# Patient Record
Sex: FEMALE | Race: BLACK OR AFRICAN AMERICAN
[De-identification: names, ages, dates, MRNs, and addresses within clinical notes are randomized per-mention and may not be internally consistent; named-entity substitution may affect disease eponyms.]

---

## 2017-06-27 ENCOUNTER — HOSPITAL ENCOUNTER (EMERGENCY)
Dept: HOSPITAL 19 - COL.ER | Age: 37
Discharge: HOME | End: 2017-06-27
Payer: OTHER GOVERNMENT

## 2017-06-27 VITALS — TEMPERATURE: 99.1 F

## 2017-06-27 VITALS — WEIGHT: 169.32 LBS | BODY MASS INDEX: 30.76 KG/M2 | HEIGHT: 62.01 IN

## 2017-06-27 VITALS — SYSTOLIC BLOOD PRESSURE: 118 MMHG | HEART RATE: 80 BPM | DIASTOLIC BLOOD PRESSURE: 75 MMHG

## 2017-06-27 DIAGNOSIS — N83.201: Primary | ICD-10-CM

## 2017-06-27 DIAGNOSIS — R11.0: ICD-10-CM

## 2017-06-27 DIAGNOSIS — J45.909: ICD-10-CM

## 2017-06-27 DIAGNOSIS — M34.9: ICD-10-CM

## 2017-06-27 DIAGNOSIS — I10: ICD-10-CM

## 2017-06-27 LAB
ADJUSTED CALCIUM: 8.8 MG/DL (ref 8.4–10.2)
ALBUMIN SERPL-MCNC: 4.4 GM/DL (ref 3.5–5)
ALP SERPL-CCNC: 77 U/L (ref 50–136)
ALT SERPL-CCNC: 16 U/L (ref 9–52)
ANION GAP SERPL CALC-SCNC: 12 MMOL/L (ref 7–16)
BASOPHILS # BLD: 0 10*3/UL (ref 0–0.2)
BASOPHILS NFR BLD AUTO: 0.8 % (ref 0–2)
BILIRUB SERPL-MCNC: 0.5 MG/DL (ref 0–1)
BUN SERPL-MCNC: 10 MG/DL (ref 7–17)
CALCIUM SERPL-MCNC: 9.1 MG/DL (ref 8.4–10.2)
CHLORIDE SERPL-SCNC: 103 MMOL/L (ref 98–107)
CO2 SERPL-SCNC: 23 MMOL/L (ref 22–30)
CREAT SERPL-SCNC: 0.61 MG/DL (ref 0.52–1.25)
CRP SERPL-MCNC: < 0.5 MG/DL (ref 0–0.9)
EOSINOPHIL # BLD: 0.3 10*3/UL (ref 0–0.7)
EOSINOPHIL NFR BLD: 5.4 % (ref 0–4)
ERYTHROCYTE [DISTWIDTH] IN BLOOD BY AUTOMATED COUNT: 13.3 % (ref 11.5–14.5)
GLUCOSE SERPL-MCNC: 71 MG/DL (ref 74–106)
GRANULOCYTES # BLD AUTO: 34.9 % (ref 42.2–75.2)
HCT VFR BLD AUTO: 37.4 % (ref 37–47)
HGB BLD-MCNC: 11.8 G/DL (ref 12.5–16)
LIPASE SERPL-CCNC: 86 U/L (ref 23–300)
LYMPHOCYTES # BLD: 2.3 10*3/UL (ref 1.2–3.4)
LYMPHOCYTES NFR BLD: 45.6 % (ref 20–51)
MCH RBC QN AUTO: 27 PG (ref 27–31)
MCHC RBC AUTO-ENTMCNC: 32 G/DL (ref 33–37)
MCV RBC AUTO: 86 FL (ref 80–100)
MONOCYTES # BLD: 0.7 10*3/UL (ref 0.1–0.6)
MONOCYTES NFR BLD AUTO: 13.1 % (ref 1.7–9.3)
NEUTROPHILS # BLD: 1.8 10*3/UL (ref 1.4–6.5)
PH UR STRIP.AUTO: 6 [PH] (ref 5–8)
PLATELET # BLD AUTO: 295 K/MM3 (ref 130–400)
PMV BLD AUTO: 11 FL (ref 7.4–10.4)
POTASSIUM SERPL-SCNC: 4.1 MMOL/L (ref 3.4–5)
PROT SERPL-MCNC: 8.2 GM/DL (ref 6.4–8.2)
RBC # BLD AUTO: 4.36 M/MM3 (ref 4.1–5.3)
SODIUM SERPL-SCNC: 138 MMOL/L (ref 137–145)
SP GR UR STRIP.AUTO: 1.01 (ref 1–1.03)
SQUAMOUS # URNS: (no result) /HPF
WBC # BLD AUTO: 5 K/MM3 (ref 4.8–10.8)
WBC #/AREA URNS HPF: (no result) /HPF

## 2017-08-11 ENCOUNTER — HOSPITAL ENCOUNTER (EMERGENCY)
Dept: HOSPITAL 19 - COL.ER | Age: 37
Discharge: HOME | End: 2017-08-11
Payer: OTHER GOVERNMENT

## 2017-08-11 VITALS — SYSTOLIC BLOOD PRESSURE: 113 MMHG | DIASTOLIC BLOOD PRESSURE: 73 MMHG | HEART RATE: 85 BPM

## 2017-08-11 VITALS — TEMPERATURE: 99.1 F

## 2017-08-11 VITALS — HEIGHT: 62.01 IN | BODY MASS INDEX: 30.96 KG/M2 | WEIGHT: 170.42 LBS

## 2017-08-11 DIAGNOSIS — I73.00: ICD-10-CM

## 2017-08-11 DIAGNOSIS — R10.31: ICD-10-CM

## 2017-08-11 DIAGNOSIS — I10: ICD-10-CM

## 2017-08-11 DIAGNOSIS — G89.29: Primary | ICD-10-CM

## 2017-08-11 DIAGNOSIS — K21.9: ICD-10-CM

## 2017-08-11 DIAGNOSIS — M34.9: ICD-10-CM

## 2017-08-11 LAB
ADJUSTED CALCIUM: 8.8 MG/DL (ref 8.4–10.2)
ALBUMIN SERPL-MCNC: 4.3 GM/DL (ref 3.5–5)
ALP SERPL-CCNC: 97 U/L (ref 50–136)
ALT SERPL-CCNC: 21 U/L (ref 9–52)
ANION GAP SERPL CALC-SCNC: 10 MMOL/L (ref 7–16)
BASOPHILS # BLD: 0 10*3/UL (ref 0–0.2)
BASOPHILS NFR BLD AUTO: 0.6 % (ref 0–2)
BILIRUB SERPL-MCNC: 0.4 MG/DL (ref 0–1)
BUN SERPL-MCNC: 12 MG/DL (ref 7–17)
CALCIUM SERPL-MCNC: 9 MG/DL (ref 8.4–10.2)
CHLORIDE SERPL-SCNC: 104 MMOL/L (ref 98–107)
CO2 SERPL-SCNC: 23 MMOL/L (ref 22–30)
CREAT SERPL-SCNC: 0.56 MG/DL (ref 0.52–1.25)
CRP SERPL-MCNC: < 0.5 MG/DL (ref 0–0.9)
EOSINOPHIL # BLD: 0.1 10*3/UL (ref 0–0.7)
EOSINOPHIL NFR BLD: 2 % (ref 0–4)
ERYTHROCYTE [DISTWIDTH] IN BLOOD BY AUTOMATED COUNT: 13.4 % (ref 11.5–14.5)
GLUCOSE SERPL-MCNC: 85 MG/DL (ref 74–106)
GRANULOCYTES # BLD AUTO: 68.7 % (ref 42.2–75.2)
HCT VFR BLD AUTO: 36 % (ref 37–47)
HGB BLD-MCNC: 11.2 G/DL (ref 12.5–16)
LYMPHOCYTES # BLD: 1.4 10*3/UL (ref 1.2–3.4)
LYMPHOCYTES NFR BLD: 20.7 % (ref 20–51)
MCH RBC QN AUTO: 26 PG (ref 27–31)
MCHC RBC AUTO-ENTMCNC: 31 G/DL (ref 33–37)
MCV RBC AUTO: 85 FL (ref 80–100)
MONOCYTES # BLD: 0.5 10*3/UL (ref 0.1–0.6)
MONOCYTES NFR BLD AUTO: 7.5 % (ref 1.7–9.3)
NEUTROPHILS # BLD: 4.5 10*3/UL (ref 1.4–6.5)
PH UR STRIP.AUTO: 7 [PH] (ref 5–8)
PLATELET # BLD AUTO: 291 K/MM3 (ref 130–400)
PMV BLD AUTO: 10.4 FL (ref 7.4–10.4)
POTASSIUM SERPL-SCNC: 3.8 MMOL/L (ref 3.4–5)
PROT SERPL-MCNC: 7.9 GM/DL (ref 6.4–8.2)
RBC # BLD AUTO: 4.24 M/MM3 (ref 4.1–5.3)
SODIUM SERPL-SCNC: 138 MMOL/L (ref 137–145)
SP GR UR STRIP.AUTO: 1.01 (ref 1–1.03)
SQUAMOUS # URNS: (no result) /HPF
WBC # BLD AUTO: 6.6 K/MM3 (ref 4.8–10.8)
WBC #/AREA URNS HPF: (no result) /HPF

## 2017-10-06 ENCOUNTER — HOSPITAL ENCOUNTER (OUTPATIENT)
Dept: HOSPITAL 19 - SDCO | Age: 37
Discharge: HOME | End: 2017-10-06
Attending: SPECIALIST
Payer: OTHER GOVERNMENT

## 2017-10-06 VITALS — DIASTOLIC BLOOD PRESSURE: 53 MMHG | HEART RATE: 76 BPM | SYSTOLIC BLOOD PRESSURE: 93 MMHG

## 2017-10-06 VITALS — TEMPERATURE: 98.1 F | DIASTOLIC BLOOD PRESSURE: 57 MMHG | SYSTOLIC BLOOD PRESSURE: 96 MMHG | HEART RATE: 73 BPM

## 2017-10-06 VITALS — DIASTOLIC BLOOD PRESSURE: 63 MMHG | HEART RATE: 73 BPM | SYSTOLIC BLOOD PRESSURE: 78 MMHG

## 2017-10-06 VITALS — HEART RATE: 74 BPM | DIASTOLIC BLOOD PRESSURE: 66 MMHG | SYSTOLIC BLOOD PRESSURE: 100 MMHG

## 2017-10-06 VITALS — HEART RATE: 88 BPM | DIASTOLIC BLOOD PRESSURE: 72 MMHG | TEMPERATURE: 98.5 F | SYSTOLIC BLOOD PRESSURE: 126 MMHG

## 2017-10-06 VITALS — HEIGHT: 62.99 IN | WEIGHT: 167.33 LBS | BODY MASS INDEX: 29.65 KG/M2

## 2017-10-06 DIAGNOSIS — K25.9: ICD-10-CM

## 2017-10-06 DIAGNOSIS — K59.00: ICD-10-CM

## 2017-10-06 DIAGNOSIS — K58.9: ICD-10-CM

## 2017-10-06 DIAGNOSIS — K22.8: Primary | ICD-10-CM

## 2017-10-06 DIAGNOSIS — D50.9: ICD-10-CM

## 2017-10-06 DIAGNOSIS — K29.30: ICD-10-CM

## 2017-10-06 DIAGNOSIS — K21.9: ICD-10-CM

## 2017-10-06 DIAGNOSIS — K30: ICD-10-CM

## 2017-11-13 ENCOUNTER — HOSPITAL ENCOUNTER (OUTPATIENT)
Dept: HOSPITAL 19 - COL.RAD | Age: 37
End: 2017-11-13
Attending: SPECIALIST
Payer: OTHER GOVERNMENT

## 2017-11-13 DIAGNOSIS — R11.0: ICD-10-CM

## 2017-11-13 DIAGNOSIS — R10.13: Primary | ICD-10-CM

## 2017-11-13 DIAGNOSIS — K59.00: ICD-10-CM

## 2017-11-13 PROCEDURE — A9537 TC99M MEBROFENIN: HCPCS

## 2017-12-23 ENCOUNTER — HOSPITAL ENCOUNTER (EMERGENCY)
Dept: HOSPITAL 19 - COL.ER | Age: 37
Discharge: HOME | End: 2017-12-23
Payer: OTHER GOVERNMENT

## 2017-12-23 VITALS — WEIGHT: 167.33 LBS | BODY MASS INDEX: 29.65 KG/M2 | HEIGHT: 62.99 IN

## 2017-12-23 VITALS — SYSTOLIC BLOOD PRESSURE: 118 MMHG | HEART RATE: 72 BPM | DIASTOLIC BLOOD PRESSURE: 71 MMHG

## 2017-12-23 VITALS — TEMPERATURE: 98.4 F

## 2017-12-23 DIAGNOSIS — R10.11: Primary | ICD-10-CM

## 2017-12-23 LAB
ADJUSTED CALCIUM: 8.7 MG/DL (ref 8.4–10.2)
ALBUMIN SERPL-MCNC: 4.6 GM/DL (ref 3.5–5)
ALP SERPL-CCNC: 70 U/L (ref 50–136)
ALT SERPL-CCNC: 21 U/L (ref 9–52)
ANION GAP SERPL CALC-SCNC: 9 MMOL/L (ref 7–16)
BASOPHILS # BLD: 0 10*3/UL (ref 0–0.2)
BASOPHILS NFR BLD AUTO: 0.7 % (ref 0–2)
BILIRUB SERPL-MCNC: 0.7 MG/DL (ref 0–1)
BUN SERPL-MCNC: 15 MG/DL (ref 7–17)
CALCIUM SERPL-MCNC: 9.2 MG/DL (ref 8.4–10.2)
CHLORIDE SERPL-SCNC: 104 MMOL/L (ref 98–107)
CO2 SERPL-SCNC: 25 MMOL/L (ref 22–30)
CREAT SERPL-SCNC: 0.58 MG/DL (ref 0.52–1.25)
CRP SERPL-MCNC: < 0.5 MG/DL (ref 0–0.9)
EOSINOPHIL # BLD: 0.2 10*3/UL (ref 0–0.7)
EOSINOPHIL NFR BLD: 3.9 % (ref 0–4)
GLUCOSE SERPL-MCNC: 87 MG/DL (ref 74–106)
GRANULOCYTES # BLD AUTO: 44.1 % (ref 42.2–75.2)
HCT VFR BLD AUTO: 34.6 % (ref 37–47)
HGB BLD-MCNC: 10.6 G/DL (ref 12.5–16)
LIPASE SERPL-CCNC: 92 U/L (ref 23–300)
LYMPHOCYTES # BLD: 1.7 10*3/UL (ref 1.2–3.4)
LYMPHOCYTES NFR BLD: 37.6 % (ref 20–51)
MCH RBC QN AUTO: 25 PG (ref 27–31)
MCHC RBC AUTO-ENTMCNC: 31 G/DL (ref 33–37)
MCV RBC AUTO: 83 FL (ref 80–100)
MONOCYTES # BLD: 0.6 10*3/UL (ref 0.1–0.6)
MONOCYTES NFR BLD AUTO: 13.5 % (ref 1.7–9.3)
NEUTROPHILS # BLD: 2 10*3/UL (ref 1.4–6.5)
PH UR STRIP.AUTO: 6 [PH] (ref 5–8)
PLATELET # BLD AUTO: 272 K/MM3 (ref 130–400)
PMV BLD AUTO: 11.2 FL (ref 7.4–10.4)
POTASSIUM SERPL-SCNC: 3.9 MMOL/L (ref 3.4–5)
PROT SERPL-MCNC: 8.1 GM/DL (ref 6.4–8.2)
RBC # BLD AUTO: 4.18 M/MM3 (ref 4.1–5.3)
RBC # UR: (no result) /HPF
SODIUM SERPL-SCNC: 138 MMOL/L (ref 137–145)
SP GR UR STRIP.AUTO: 1.02 (ref 1–1.03)
SQUAMOUS # URNS: (no result) /HPF
URN COLLECT METHOD CLASS: (no result)
WBC # BLD AUTO: 4.6 K/MM3 (ref 4.8–10.8)
WBC #/AREA URNS HPF: (no result) /HPF

## 2018-01-15 ENCOUNTER — HOSPITAL ENCOUNTER (EMERGENCY)
Dept: HOSPITAL 19 - COL.ER | Age: 38
LOS: 1 days | Discharge: HOME | End: 2018-01-16
Payer: OTHER GOVERNMENT

## 2018-01-15 VITALS — BODY MASS INDEX: 32.05 KG/M2 | WEIGHT: 176.37 LBS | HEIGHT: 62.01 IN

## 2018-01-15 VITALS — TEMPERATURE: 99.3 F

## 2018-01-15 DIAGNOSIS — Z87.19: ICD-10-CM

## 2018-01-15 DIAGNOSIS — N89.8: Primary | ICD-10-CM

## 2018-01-15 DIAGNOSIS — Z98.51: ICD-10-CM

## 2018-01-15 DIAGNOSIS — J45.909: ICD-10-CM

## 2018-01-15 DIAGNOSIS — E78.5: ICD-10-CM

## 2018-01-15 LAB
ALBUMIN SERPL-MCNC: 4.4 GM/DL (ref 3.5–5)
ALP SERPL-CCNC: 78 U/L (ref 50–136)
ALT SERPL-CCNC: 25 U/L (ref 9–52)
ANION GAP SERPL CALC-SCNC: 9 MMOL/L (ref 7–16)
AST SERPL-CCNC: 29 U/L (ref 15–37)
BASOPHILS # BLD: 0 10*3/UL (ref 0–0.2)
BASOPHILS NFR BLD AUTO: 0.7 % (ref 0–2)
BILIRUB SERPL-MCNC: 0.3 MG/DL (ref 0–1)
BUN SERPL-MCNC: 13 MG/DL (ref 7–17)
CALCIUM SERPL-MCNC: 9.2 MG/DL (ref 8.4–10.2)
CHLORIDE SERPL-SCNC: 105 MMOL/L (ref 98–107)
CO2 SERPL-SCNC: 27 MMOL/L (ref 22–30)
CREAT SERPL-SCNC: 0.69 MG/DL (ref 0.52–1.25)
EOSINOPHIL # BLD: 0.2 10*3/UL (ref 0–0.7)
EOSINOPHIL NFR BLD: 4.4 % (ref 0–4)
ERYTHROCYTE [DISTWIDTH] IN BLOOD BY AUTOMATED COUNT: 14.3 % (ref 11.5–14.5)
GLUCOSE SERPL-MCNC: 87 MG/DL (ref 74–106)
GRANULOCYTES # BLD AUTO: 33 % (ref 42.2–75.2)
HCT VFR BLD AUTO: 35.9 % (ref 37–47)
HGB BLD-MCNC: 10.8 G/DL (ref 12.5–16)
LYMPHOCYTES # BLD: 2.4 10*3/UL (ref 1.2–3.4)
LYMPHOCYTES NFR BLD: 52.9 % (ref 20–51)
MCH RBC QN AUTO: 25 PG (ref 27–31)
MCHC RBC AUTO-ENTMCNC: 30 G/DL (ref 33–37)
MCV RBC AUTO: 82 FL (ref 80–100)
MONOCYTES # BLD: 0.4 10*3/UL (ref 0.1–0.6)
MONOCYTES NFR BLD AUTO: 8.8 % (ref 1.7–9.3)
NEUTROPHILS # BLD: 1.5 10*3/UL (ref 1.4–6.5)
PH UR STRIP.AUTO: 6 [PH] (ref 5–8)
PLATELET # BLD AUTO: 313 K/MM3 (ref 130–400)
PMV BLD AUTO: 10.4 FL (ref 7.4–10.4)
POTASSIUM SERPL-SCNC: 3.7 MMOL/L (ref 3.4–5)
PROT SERPL-MCNC: 8 GM/DL (ref 6.4–8.2)
RBC # BLD AUTO: 4.38 M/MM3 (ref 4.1–5.3)
RBC # UR STRIP.AUTO: (no result) /UL
RBC # UR: (no result) /HPF
SODIUM SERPL-SCNC: 141 MMOL/L (ref 137–145)
SP GR UR STRIP.AUTO: 1 (ref 1–1.03)
SQUAMOUS # URNS: (no result) /HPF
URN COLLECT METHOD CLASS: (no result)

## 2018-01-16 VITALS — HEART RATE: 80 BPM | SYSTOLIC BLOOD PRESSURE: 115 MMHG | DIASTOLIC BLOOD PRESSURE: 81 MMHG

## 2018-04-07 ENCOUNTER — HOSPITAL ENCOUNTER (EMERGENCY)
Dept: HOSPITAL 19 - COL.ER | Age: 38
Discharge: HOME | End: 2018-04-07
Payer: OTHER GOVERNMENT

## 2018-04-07 VITALS — BODY MASS INDEX: 29.84 KG/M2 | HEIGHT: 62.01 IN | WEIGHT: 164.24 LBS

## 2018-04-07 VITALS — TEMPERATURE: 98.5 F

## 2018-04-07 VITALS — SYSTOLIC BLOOD PRESSURE: 109 MMHG | HEART RATE: 84 BPM | DIASTOLIC BLOOD PRESSURE: 83 MMHG

## 2018-04-07 DIAGNOSIS — E78.5: ICD-10-CM

## 2018-04-07 DIAGNOSIS — Z90.49: ICD-10-CM

## 2018-04-07 DIAGNOSIS — Z98.51: ICD-10-CM

## 2018-04-07 DIAGNOSIS — R10.31: ICD-10-CM

## 2018-04-07 DIAGNOSIS — Z87.2: ICD-10-CM

## 2018-04-07 DIAGNOSIS — K21.9: ICD-10-CM

## 2018-04-07 DIAGNOSIS — G89.29: Primary | ICD-10-CM

## 2018-04-07 DIAGNOSIS — I10: ICD-10-CM

## 2018-04-07 LAB
ALBUMIN SERPL-MCNC: 4 GM/DL (ref 3.5–5)
ALP SERPL-CCNC: 102 U/L (ref 50–136)
ALT SERPL-CCNC: 31 U/L (ref 9–52)
ANION GAP SERPL CALC-SCNC: 14 MMOL/L (ref 7–16)
APAP SERPL-MCNC: < 10 UG/ML (ref 10–30)
AST SERPL-CCNC: 28 U/L (ref 15–37)
BASOPHILS # BLD: 0 10*3/UL (ref 0–0.2)
BASOPHILS NFR BLD AUTO: 0.5 % (ref 0–2)
BILIRUB SERPL-MCNC: 0.2 MG/DL (ref 0–1)
BUN SERPL-MCNC: 13 MG/DL (ref 7–17)
CALCIUM SERPL-MCNC: 8.7 MG/DL (ref 8.4–10.2)
CHLORIDE SERPL-SCNC: 105 MMOL/L (ref 98–107)
CO2 SERPL-SCNC: 22 MMOL/L (ref 22–30)
CREAT SERPL-SCNC: 0.61 MG/DL (ref 0.52–1.25)
CRP SERPL-MCNC: < 0.5 MG/DL (ref 0–0.9)
EOSINOPHIL # BLD: 0.2 10*3/UL (ref 0–0.7)
EOSINOPHIL NFR BLD: 2.7 % (ref 0–4)
ERYTHROCYTE [DISTWIDTH] IN BLOOD BY AUTOMATED COUNT: 14.4 % (ref 11.5–14.5)
GLUCOSE SERPL-MCNC: 85 MG/DL (ref 74–106)
GRANULOCYTES # BLD AUTO: 43.9 % (ref 42.2–75.2)
HCT VFR BLD AUTO: 32.2 % (ref 37–47)
HGB BLD-MCNC: 9.8 G/DL (ref 12.5–16)
INR BLD: 1.1 (ref 0.8–3)
LIPASE SERPL-CCNC: 142 U/L (ref 23–300)
LYMPHOCYTES # BLD: 2.3 10*3/UL (ref 1.2–3.4)
LYMPHOCYTES NFR BLD: 40.9 % (ref 20–51)
MCH RBC QN AUTO: 25 PG (ref 27–31)
MCHC RBC AUTO-ENTMCNC: 30 G/DL (ref 33–37)
MCV RBC AUTO: 81 FL (ref 80–100)
MONOCYTES # BLD: 0.7 10*3/UL (ref 0.1–0.6)
MONOCYTES NFR BLD AUTO: 11.8 % (ref 1.7–9.3)
NEUTROPHILS # BLD: 2.4 10*3/UL (ref 1.4–6.5)
PH UR STRIP.AUTO: 6 [PH] (ref 5–8)
PLATELET # BLD AUTO: 286 K/MM3 (ref 130–400)
PMV BLD AUTO: 11 FL (ref 7.4–10.4)
POTASSIUM SERPL-SCNC: 3.8 MMOL/L (ref 3.4–5)
PROT SERPL-MCNC: 8.3 GM/DL (ref 6.4–8.2)
PROTHROMBIN TIME: 12.3 SECONDS (ref 9.7–12.8)
RBC # BLD AUTO: 3.99 M/MM3 (ref 4.1–5.3)
SODIUM SERPL-SCNC: 141 MMOL/L (ref 137–145)
SP GR UR STRIP.AUTO: 1 (ref 1–1.03)
SQUAMOUS # URNS: (no result) /HPF
URN COLLECT METHOD CLASS: (no result)

## 2018-04-10 ENCOUNTER — HOSPITAL ENCOUNTER (OUTPATIENT)
Dept: HOSPITAL 19 - MHCPAIN | Age: 38
End: 2018-04-10
Attending: ANESTHESIOLOGY
Payer: OTHER GOVERNMENT

## 2018-04-10 DIAGNOSIS — G58.8: Primary | ICD-10-CM

## 2018-05-04 ENCOUNTER — HOSPITAL ENCOUNTER (OUTPATIENT)
Dept: HOSPITAL 19 - MHCPAIN | Age: 38
End: 2018-05-04
Attending: ANESTHESIOLOGY
Payer: OTHER GOVERNMENT

## 2018-05-04 DIAGNOSIS — M79.1: ICD-10-CM

## 2018-05-04 DIAGNOSIS — G89.29: Primary | ICD-10-CM

## 2018-05-04 DIAGNOSIS — M79.2: ICD-10-CM

## 2018-05-04 PROCEDURE — G0463 HOSPITAL OUTPT CLINIC VISIT: HCPCS

## 2018-05-11 ENCOUNTER — HOSPITAL ENCOUNTER (OUTPATIENT)
Dept: HOSPITAL 19 - MHCPAIN | Age: 38
End: 2018-05-11
Attending: ANESTHESIOLOGY
Payer: OTHER GOVERNMENT

## 2018-05-11 DIAGNOSIS — G57.81: Primary | ICD-10-CM

## 2018-07-02 ENCOUNTER — HOSPITAL ENCOUNTER (EMERGENCY)
Dept: HOSPITAL 19 - COL.ER | Age: 38
Discharge: HOME | End: 2018-07-02
Payer: OTHER GOVERNMENT

## 2018-07-02 VITALS — DIASTOLIC BLOOD PRESSURE: 61 MMHG | HEART RATE: 67 BPM | SYSTOLIC BLOOD PRESSURE: 107 MMHG

## 2018-07-02 VITALS — BODY MASS INDEX: 28.04 KG/M2 | WEIGHT: 154.32 LBS | HEIGHT: 62.01 IN

## 2018-07-02 VITALS — TEMPERATURE: 98.2 F

## 2018-07-02 DIAGNOSIS — R10.11: ICD-10-CM

## 2018-07-02 DIAGNOSIS — G89.29: Primary | ICD-10-CM

## 2018-07-02 DIAGNOSIS — I10: ICD-10-CM

## 2018-07-02 DIAGNOSIS — R10.31: ICD-10-CM

## 2018-07-02 DIAGNOSIS — K21.9: ICD-10-CM

## 2018-07-02 LAB
ALBUMIN SERPL-MCNC: 4.2 GM/DL (ref 3.5–5)
ALP SERPL-CCNC: 93 U/L (ref 50–136)
ALT SERPL-CCNC: 19 U/L (ref 9–52)
ANION GAP SERPL CALC-SCNC: 11 MMOL/L (ref 7–16)
AST SERPL-CCNC: 30 U/L (ref 15–37)
BASOPHILS # BLD: 0 10*3/UL (ref 0–0.2)
BASOPHILS NFR BLD AUTO: 0.5 % (ref 0–2)
BILIRUB SERPL-MCNC: 0.2 MG/DL (ref 0–1)
BUN SERPL-MCNC: 15 MG/DL (ref 7–17)
CALCIUM SERPL-MCNC: 9.2 MG/DL (ref 8.4–10.2)
CHLORIDE SERPL-SCNC: 103 MMOL/L (ref 98–107)
CO2 SERPL-SCNC: 26 MMOL/L (ref 22–30)
CREAT SERPL-SCNC: 0.62 MG/DL (ref 0.52–1.25)
CRP SERPL-MCNC: < 0.5 MG/DL (ref 0–0.9)
EOSINOPHIL # BLD: 0.1 10*3/UL (ref 0–0.7)
EOSINOPHIL NFR BLD: 2.4 % (ref 0–4)
ERYTHROCYTE [DISTWIDTH] IN BLOOD BY AUTOMATED COUNT: 16.2 % (ref 11.5–14.5)
GLUCOSE SERPL-MCNC: 82 MG/DL (ref 74–106)
GRANULOCYTES # BLD AUTO: 31.7 % (ref 42.2–75.2)
HCT VFR BLD AUTO: 34.6 % (ref 37–47)
HGB BLD-MCNC: 10.2 G/DL (ref 12.5–16)
LYMPHOCYTES # BLD: 2.2 10*3/UL (ref 1.2–3.4)
LYMPHOCYTES NFR BLD: 53.3 % (ref 20–51)
MCH RBC QN AUTO: 24 PG (ref 27–31)
MCHC RBC AUTO-ENTMCNC: 30 G/DL (ref 33–37)
MCV RBC AUTO: 81 FL (ref 80–100)
MONOCYTES # BLD: 0.5 10*3/UL (ref 0.1–0.6)
MONOCYTES NFR BLD AUTO: 11.9 % (ref 1.7–9.3)
NEUTROPHILS # BLD: 1.3 10*3/UL (ref 1.4–6.5)
PH UR STRIP.AUTO: 6 [PH] (ref 5–8)
PLATELET # BLD AUTO: 298 K/MM3 (ref 130–400)
PMV BLD AUTO: 10.6 FL (ref 7.4–10.4)
POTASSIUM SERPL-SCNC: 3.5 MMOL/L (ref 3.4–5)
PROT SERPL-MCNC: 7.9 GM/DL (ref 6.4–8.2)
RBC # BLD AUTO: 4.27 M/MM3 (ref 4.1–5.3)
RBC # UR: (no result) /HPF
SODIUM SERPL-SCNC: 140 MMOL/L (ref 137–145)
SP GR UR STRIP.AUTO: 1.01 (ref 1–1.03)
SQUAMOUS # URNS: (no result) /HPF
URN COLLECT METHOD CLASS: (no result)

## 2019-01-12 ENCOUNTER — HOSPITAL ENCOUNTER (EMERGENCY)
Dept: HOSPITAL 19 - COL.ER | Age: 39
LOS: 1 days | Discharge: HOME | End: 2019-01-13
Payer: OTHER GOVERNMENT

## 2019-01-12 VITALS — WEIGHT: 170.42 LBS | HEIGHT: 62.01 IN | BODY MASS INDEX: 30.96 KG/M2

## 2019-01-12 VITALS — TEMPERATURE: 98.8 F

## 2019-01-12 DIAGNOSIS — E78.5: ICD-10-CM

## 2019-01-12 DIAGNOSIS — Z87.39: ICD-10-CM

## 2019-01-12 DIAGNOSIS — I10: ICD-10-CM

## 2019-01-12 DIAGNOSIS — R07.89: Primary | ICD-10-CM

## 2019-01-12 DIAGNOSIS — K21.9: ICD-10-CM

## 2019-01-12 LAB
ALBUMIN SERPL-MCNC: 4.3 GM/DL (ref 3.5–5)
ALP SERPL-CCNC: 80 U/L (ref 50–136)
ALT SERPL-CCNC: 13 U/L (ref 9–52)
ANION GAP SERPL CALC-SCNC: 7 MMOL/L (ref 7–16)
AST SERPL-CCNC: 32 U/L (ref 15–37)
BASOPHILS # BLD: 0 10*3/UL (ref 0–0.2)
BASOPHILS NFR BLD AUTO: 0.6 % (ref 0–2)
BILIRUB SERPL-MCNC: 0.3 MG/DL (ref 0–1)
BUN SERPL-MCNC: 13 MG/DL (ref 7–17)
CALCIUM SERPL-MCNC: 9.3 MG/DL (ref 8.4–10.2)
CHLORIDE SERPL-SCNC: 104 MMOL/L (ref 98–107)
CO2 SERPL-SCNC: 28 MMOL/L (ref 22–30)
CREAT SERPL-SCNC: 0.79 MG/DL (ref 0.52–1.25)
CRP SERPL-MCNC: < 0.5 MG/DL (ref 0–0.9)
EOSINOPHIL # BLD: 0.2 10*3/UL (ref 0–0.7)
EOSINOPHIL NFR BLD: 3.4 % (ref 0–4)
ERYTHROCYTE [DISTWIDTH] IN BLOOD BY AUTOMATED COUNT: 14.7 % (ref 11.5–14.5)
GLUCOSE SERPL-MCNC: 82 MG/DL (ref 74–106)
GRANULOCYTES # BLD AUTO: 37.5 % (ref 42.2–75.2)
HCT VFR BLD AUTO: 36.5 % (ref 37–47)
HGB BLD-MCNC: 11.1 G/DL (ref 12.5–16)
LIPASE SERPL-CCNC: 119 U/L (ref 23–300)
LYMPHOCYTES # BLD: 2.2 10*3/UL (ref 1.2–3.4)
LYMPHOCYTES NFR BLD: 47.1 % (ref 20–51)
MCH RBC QN AUTO: 25 PG (ref 27–31)
MCHC RBC AUTO-ENTMCNC: 30 G/DL (ref 33–37)
MCV RBC AUTO: 83 FL (ref 80–100)
MONOCYTES # BLD: 0.5 10*3/UL (ref 0.1–0.6)
MONOCYTES NFR BLD AUTO: 11.4 % (ref 1.7–9.3)
NEUTROPHILS # BLD: 1.8 10*3/UL (ref 1.4–6.5)
PLATELET # BLD AUTO: 300 K/MM3 (ref 130–400)
PMV BLD AUTO: 10.6 FL (ref 7.4–10.4)
POTASSIUM SERPL-SCNC: 3.9 MMOL/L (ref 3.4–5)
PROT SERPL-MCNC: 8 GM/DL (ref 6.4–8.2)
RBC # BLD AUTO: 4.4 M/MM3 (ref 4.1–5.3)
SODIUM SERPL-SCNC: 139 MMOL/L (ref 137–145)
TROPONIN I SERPL-MCNC: < 0.012 NG/ML (ref 0–0.03)

## 2019-01-13 VITALS — SYSTOLIC BLOOD PRESSURE: 107 MMHG | DIASTOLIC BLOOD PRESSURE: 70 MMHG | HEART RATE: 75 BPM

## 2019-03-06 ENCOUNTER — HOSPITAL ENCOUNTER (OUTPATIENT)
Dept: HOSPITAL 19 - COL.RAD | Age: 39
End: 2019-03-06
Attending: PHYSICIAN ASSISTANT
Payer: OTHER GOVERNMENT

## 2019-03-06 DIAGNOSIS — K21.9: Primary | ICD-10-CM

## 2019-03-06 DIAGNOSIS — K58.9: ICD-10-CM

## 2019-03-06 PROCEDURE — A9541 TC99M SULFUR COLLOID: HCPCS

## 2019-07-24 ENCOUNTER — HOSPITAL ENCOUNTER (OUTPATIENT)
Dept: HOSPITAL 19 - COL.RAD | Age: 39
End: 2019-07-24
Attending: ORTHOPAEDIC SURGERY
Payer: OTHER GOVERNMENT

## 2019-07-24 DIAGNOSIS — M25.751: Primary | ICD-10-CM

## 2019-07-24 PROCEDURE — A9585 GADOBUTROL INJECTION: HCPCS

## 2019-08-15 ENCOUNTER — HOSPITAL ENCOUNTER (OUTPATIENT)
Dept: HOSPITAL 19 - COL.RAD | Age: 39
End: 2019-08-15
Attending: ORTHOPAEDIC SURGERY
Payer: OTHER GOVERNMENT

## 2019-08-15 DIAGNOSIS — M25.551: Primary | ICD-10-CM

## 2019-11-21 ENCOUNTER — HOSPITAL ENCOUNTER (OUTPATIENT)
Dept: HOSPITAL 19 - WSC | Age: 39
LOS: 29 days | Discharge: HOME | End: 2019-12-20
Attending: FAMILY MEDICINE
Payer: OTHER GOVERNMENT

## 2019-11-21 DIAGNOSIS — M25.562: ICD-10-CM

## 2019-11-21 DIAGNOSIS — M25.559: Primary | ICD-10-CM

## 2019-12-05 ENCOUNTER — HOSPITAL ENCOUNTER (EMERGENCY)
Dept: HOSPITAL 19 - COL.ER | Age: 39
Discharge: HOME | End: 2019-12-05
Payer: OTHER GOVERNMENT

## 2019-12-05 VITALS — HEIGHT: 62.01 IN | BODY MASS INDEX: 31.49 KG/M2 | WEIGHT: 173.28 LBS

## 2019-12-05 VITALS — HEART RATE: 78 BPM | DIASTOLIC BLOOD PRESSURE: 78 MMHG | TEMPERATURE: 98.1 F | SYSTOLIC BLOOD PRESSURE: 122 MMHG

## 2019-12-05 DIAGNOSIS — Z98.51: ICD-10-CM

## 2019-12-05 DIAGNOSIS — Z90.89: ICD-10-CM

## 2019-12-05 DIAGNOSIS — Z95.9: ICD-10-CM

## 2019-12-05 DIAGNOSIS — M79.671: Primary | ICD-10-CM

## 2019-12-05 LAB
ALBUMIN SERPL-MCNC: 4.6 GM/DL (ref 3.5–5)
ALP SERPL-CCNC: 76 U/L (ref 50–136)
ALT SERPL-CCNC: 14 U/L (ref 9–52)
ANION GAP SERPL CALC-SCNC: 10 MMOL/L (ref 7–16)
AST SERPL-CCNC: 28 U/L (ref 15–37)
BASOPHILS # BLD: 0 10*3/UL (ref 0–0.2)
BASOPHILS NFR BLD AUTO: 0.7 % (ref 0–2)
BILIRUB SERPL-MCNC: 0.3 MG/DL (ref 0–1)
BUN SERPL-MCNC: 8 MG/DL (ref 7–17)
CALCIUM SERPL-MCNC: 9.4 MG/DL (ref 8.4–10.2)
CHLORIDE SERPL-SCNC: 106 MMOL/L (ref 98–107)
CO2 SERPL-SCNC: 25 MMOL/L (ref 22–30)
CREAT SERPL-SCNC: 0.6 UMOL/L (ref 0.52–1.25)
CRP SERPL-MCNC: < 0.5 MG/DL (ref 0–0.9)
EOSINOPHIL # BLD: 0.2 10*3/UL (ref 0–0.7)
EOSINOPHIL NFR BLD: 4.4 % (ref 0–4)
ERYTHROCYTE [DISTWIDTH] IN BLOOD BY AUTOMATED COUNT: 16 % (ref 11.5–14.5)
ERYTHROCYTE [SEDIMENTATION RATE] IN BLOOD: 12 MM/HR (ref 0–20)
GLUCOSE SERPL-MCNC: 105 MG/DL (ref 74–106)
GRANULOCYTES # BLD AUTO: 43.2 % (ref 42.2–75.2)
HCT VFR BLD AUTO: 38.2 % (ref 37–47)
HGB BLD-MCNC: 12 G/DL (ref 12.5–16)
LYMPHOCYTES # BLD: 1.8 10*3/UL (ref 1.2–3.4)
LYMPHOCYTES NFR BLD: 41 % (ref 20–51)
MCH RBC QN AUTO: 28 PG (ref 27–31)
MCHC RBC AUTO-ENTMCNC: 31 G/DL (ref 33–37)
MCV RBC AUTO: 88 FL (ref 80–100)
MONOCYTES # BLD: 0.5 10*3/UL (ref 0.1–0.6)
MONOCYTES NFR BLD AUTO: 10.5 % (ref 1.7–9.3)
NEUTROPHILS # BLD: 1.8 10*3/UL (ref 1.4–6.5)
PLATELET # BLD AUTO: 253 K/MM3 (ref 130–400)
PMV BLD AUTO: 11.1 FL (ref 7.4–10.4)
POTASSIUM SERPL-SCNC: 3.6 MMOL/L (ref 3.4–5)
PROT SERPL-MCNC: 8.1 GM/DL (ref 6.4–8.2)
RBC # BLD AUTO: 4.36 M/MM3 (ref 4.1–5.3)
SODIUM SERPL-SCNC: 141 MMOL/L (ref 137–145)
URATE SERPL-MCNC: 3.3 MG/DL (ref 2.5–6.2)

## 2021-02-03 ENCOUNTER — HOSPITAL ENCOUNTER (EMERGENCY)
Dept: HOSPITAL 19 - COL.ER | Age: 41
Discharge: HOME | End: 2021-02-03
Attending: EMERGENCY MEDICINE
Payer: OTHER GOVERNMENT

## 2021-02-03 VITALS — HEIGHT: 62.99 IN | BODY MASS INDEX: 30.35 KG/M2 | WEIGHT: 171.3 LBS

## 2021-02-03 VITALS — DIASTOLIC BLOOD PRESSURE: 73 MMHG | SYSTOLIC BLOOD PRESSURE: 135 MMHG | TEMPERATURE: 98.6 F

## 2021-02-03 VITALS — HEART RATE: 70 BPM

## 2021-02-03 DIAGNOSIS — Z88.1: ICD-10-CM

## 2021-02-03 DIAGNOSIS — M79.661: Primary | ICD-10-CM

## 2021-02-03 LAB
ALBUMIN SERPL-MCNC: 4.5 GM/DL (ref 3.5–5)
ALP SERPL-CCNC: 87 U/L (ref 50–136)
ALT SERPL-CCNC: 11 U/L (ref 4–34)
ANION GAP SERPL CALC-SCNC: 11 MMOL/L (ref 7–16)
AST SERPL-CCNC: 22 U/L (ref 15–37)
BASOPHILS # BLD: 0 10*3/UL (ref 0–0.2)
BASOPHILS NFR BLD AUTO: 0.3 % (ref 0–2)
BILIRUB SERPL-MCNC: 0.3 MG/DL (ref 0–1)
BUN SERPL-MCNC: 16 MG/DL (ref 7–17)
CALCIUM SERPL-MCNC: 9.6 MG/DL (ref 8.4–10.2)
CHLORIDE SERPL-SCNC: 102 MMOL/L (ref 98–107)
CO2 SERPL-SCNC: 27 MMOL/L (ref 22–30)
CREAT SERPL-SCNC: 0.83 UMOL/L (ref 0.52–1.25)
CRP SERPL-MCNC: < 0.5 MG/DL (ref 0–0.9)
DEPRECATED D DIMER PPP IA-ACNC: 176 NG/MLDDU (ref 200–230)
EOSINOPHIL # BLD: 0.1 10*3/UL (ref 0–0.7)
EOSINOPHIL NFR BLD: 1.8 % (ref 0–4)
ERYTHROCYTE [DISTWIDTH] IN BLOOD BY AUTOMATED COUNT: 12.5 % (ref 11.5–14.5)
GLUCOSE SERPL-MCNC: 82 MG/DL (ref 74–106)
GRANULOCYTES # BLD AUTO: 49.4 % (ref 42.2–75.2)
HCT VFR BLD AUTO: 41 % (ref 37–47)
HGB BLD-MCNC: 12.7 G/DL (ref 12.5–16)
INR BLD: 1 (ref 0.8–3)
LYMPHOCYTES # BLD: 2.3 10*3/UL (ref 1.2–3.4)
LYMPHOCYTES NFR BLD: 37.9 % (ref 20–51)
MCH RBC QN AUTO: 29 PG (ref 27–31)
MCHC RBC AUTO-ENTMCNC: 31 G/DL (ref 33–37)
MCV RBC AUTO: 94 FL (ref 80–100)
MONOCYTES # BLD: 0.6 10*3/UL (ref 0.1–0.6)
MONOCYTES NFR BLD AUTO: 10.3 % (ref 1.7–9.3)
NEUTROPHILS # BLD: 3 10*3/UL (ref 1.4–6.5)
PLATELET # BLD AUTO: 276 K/MM3 (ref 130–400)
PMV BLD AUTO: 10.3 FL (ref 7.4–10.4)
POTASSIUM SERPL-SCNC: 4.2 MMOL/L (ref 3.4–5)
PROT SERPL-MCNC: 7.7 GM/DL (ref 6.4–8.2)
PROTHROMBIN TIME: 11.2 SECONDS (ref 9.7–12.8)
RBC # BLD AUTO: 4.38 M/MM3 (ref 4.1–5.3)
SODIUM SERPL-SCNC: 139 MMOL/L (ref 137–145)

## 2021-10-07 ENCOUNTER — HOSPITAL ENCOUNTER (OUTPATIENT)
Dept: HOSPITAL 19 - SDCO | Age: 41
LOS: 1 days | Discharge: HOME | End: 2021-10-08
Attending: OBSTETRICS & GYNECOLOGY
Payer: OTHER GOVERNMENT

## 2021-10-07 VITALS — DIASTOLIC BLOOD PRESSURE: 84 MMHG | HEART RATE: 74 BPM | SYSTOLIC BLOOD PRESSURE: 117 MMHG

## 2021-10-07 VITALS — DIASTOLIC BLOOD PRESSURE: 57 MMHG | SYSTOLIC BLOOD PRESSURE: 123 MMHG | HEART RATE: 73 BPM

## 2021-10-07 VITALS — HEART RATE: 72 BPM | SYSTOLIC BLOOD PRESSURE: 120 MMHG | DIASTOLIC BLOOD PRESSURE: 56 MMHG | TEMPERATURE: 98.8 F

## 2021-10-07 VITALS — DIASTOLIC BLOOD PRESSURE: 61 MMHG | SYSTOLIC BLOOD PRESSURE: 127 MMHG | HEART RATE: 79 BPM

## 2021-10-07 VITALS — DIASTOLIC BLOOD PRESSURE: 68 MMHG | HEART RATE: 93 BPM | SYSTOLIC BLOOD PRESSURE: 114 MMHG | TEMPERATURE: 98.6 F

## 2021-10-07 VITALS — HEART RATE: 71 BPM | SYSTOLIC BLOOD PRESSURE: 126 MMHG | DIASTOLIC BLOOD PRESSURE: 65 MMHG

## 2021-10-07 VITALS — DIASTOLIC BLOOD PRESSURE: 69 MMHG | SYSTOLIC BLOOD PRESSURE: 135 MMHG | HEART RATE: 84 BPM

## 2021-10-07 VITALS — HEART RATE: 77 BPM | SYSTOLIC BLOOD PRESSURE: 130 MMHG | DIASTOLIC BLOOD PRESSURE: 67 MMHG

## 2021-10-07 VITALS — TEMPERATURE: 98.8 F | HEART RATE: 70 BPM | DIASTOLIC BLOOD PRESSURE: 57 MMHG | SYSTOLIC BLOOD PRESSURE: 123 MMHG

## 2021-10-07 VITALS — HEART RATE: 76 BPM | SYSTOLIC BLOOD PRESSURE: 122 MMHG | DIASTOLIC BLOOD PRESSURE: 63 MMHG

## 2021-10-07 VITALS — WEIGHT: 175.71 LBS | BODY MASS INDEX: 31.13 KG/M2 | HEIGHT: 63 IN

## 2021-10-07 VITALS — HEART RATE: 74 BPM | DIASTOLIC BLOOD PRESSURE: 64 MMHG | SYSTOLIC BLOOD PRESSURE: 122 MMHG | TEMPERATURE: 97.5 F

## 2021-10-07 DIAGNOSIS — K21.9: ICD-10-CM

## 2021-10-07 DIAGNOSIS — I27.20: ICD-10-CM

## 2021-10-07 DIAGNOSIS — N92.0: ICD-10-CM

## 2021-10-07 DIAGNOSIS — N83.8: ICD-10-CM

## 2021-10-07 DIAGNOSIS — Z20.822: ICD-10-CM

## 2021-10-07 DIAGNOSIS — M06.9: ICD-10-CM

## 2021-10-07 DIAGNOSIS — E66.9: ICD-10-CM

## 2021-10-07 DIAGNOSIS — D25.2: Primary | ICD-10-CM

## 2021-10-07 DIAGNOSIS — M34.9: ICD-10-CM

## 2021-10-07 DIAGNOSIS — D64.9: ICD-10-CM

## 2021-10-07 DIAGNOSIS — Z79.899: ICD-10-CM

## 2021-10-07 LAB — HCG SERPL QL: NEGATIVE

## 2021-10-07 PROCEDURE — A4314 CATH W/DRAINAGE 2-WAY LATEX: HCPCS

## 2021-10-07 NOTE — NUR
PT RESTING IN BED. EVENING MEDICATIONS GIVEN. SHIFT ASSESSMENT COMPLETED. FOUR
LAP SITES NOTED TO ABDOMEN, THEY APPEAR C/D/I. PT REPORTS ABDOMINAL CRAMPING
AS WELL AS SOME LOWER BACK PAIN. TOLERATING CLEAR LIQUID DIET, DENIES HAVING A
BM OR PASSING GAS. DOES REPORT FEELING HER STOMACH "GURGLE." REPORTS MINIMAL
VAGINAL BLEEDING.

## 2021-10-07 NOTE — NUR
Pt ambulated to the live and back with a steady gait. Requesting to have mclaughlin
dc'd, discussed with Dr. Chappell. Mclaughlin dc'd after ambulation. Four lap
sites to abdomen CDI, edges well approximated. IVF infusing into R hand
without issue. Pt on clear liquid diet. Will progress when able. POC discussed
with patient and her . No needs at this time. Call light within reach.

## 2021-10-08 VITALS — HEART RATE: 74 BPM | DIASTOLIC BLOOD PRESSURE: 55 MMHG | TEMPERATURE: 99.1 F | SYSTOLIC BLOOD PRESSURE: 128 MMHG

## 2021-10-08 VITALS — TEMPERATURE: 99 F | HEART RATE: 73 BPM | DIASTOLIC BLOOD PRESSURE: 59 MMHG | SYSTOLIC BLOOD PRESSURE: 119 MMHG

## 2021-10-08 NOTE — NUR
Discharge education provided to patient. Educated on when to call provider and
patient states she already has follow up appointments scheduled. Educated on
all new medications and medication safety. INT to right hand discontinued
catheter tip intact. Denies needs at this time. Patient out with family and
surgical staff.

## 2021-10-08 NOTE — NUR
Initial visit; Patient thanked  for looking in on her, keeping her in
's prayers and offering God's blessings.

## 2021-10-08 NOTE — NUR
Patient sitting up in bed. Alert and oriented x 3. Assessment complete. Denies
pain at this time. Patient up independently in room. Lap sites x 4 with edges
wel approximated. IV to  right hand without complications. Denies needs at
this time.

## 2021-10-08 NOTE — NUR
PT HAS EXPERIENCED MODERATE PAIN THROUGHOUT MOST OF THE NIGHT. HAVE BEEN
TRYING TO STAY ON TOP OF IT BY ROTATING PAIN MEDICATIONS. PT HAS NOT PASSED
ANY GAS. WILL CONTINUE TO MONITOR.

## 2022-08-13 ENCOUNTER — HOSPITAL ENCOUNTER (EMERGENCY)
Dept: HOSPITAL 19 - COL.ER | Age: 42
Discharge: LEFT BEFORE DISCHARGE | End: 2022-08-13
Payer: OTHER GOVERNMENT

## 2022-08-13 DIAGNOSIS — R69: Primary | ICD-10-CM

## 2024-08-23 ENCOUNTER — HOSPITAL ENCOUNTER (EMERGENCY)
Dept: HOSPITAL 19 - COL.ER | Age: 44
Discharge: HOME | End: 2024-08-23
Attending: EMERGENCY MEDICINE
Payer: OTHER GOVERNMENT

## 2024-08-23 VITALS — DIASTOLIC BLOOD PRESSURE: 79 MMHG | HEART RATE: 90 BPM | SYSTOLIC BLOOD PRESSURE: 134 MMHG

## 2024-08-23 VITALS — WEIGHT: 172.4 LBS | HEIGHT: 62.01 IN | BODY MASS INDEX: 31.33 KG/M2

## 2024-08-23 VITALS — TEMPERATURE: 99.3 F

## 2024-08-23 DIAGNOSIS — R19.7: ICD-10-CM

## 2024-08-23 DIAGNOSIS — R07.89: Primary | ICD-10-CM

## 2024-08-23 DIAGNOSIS — R11.2: ICD-10-CM

## 2024-08-23 LAB
ALBUMIN SERPL-MCNC: 3.9 G/DL (ref 3.5–5)
ALP SERPL-CCNC: 61 U/L (ref 40–150)
ALT SERPL-CCNC: 12 U/L (ref 0–55)
ANION GAP SERPL CALC-SCNC: 10 MMOL/L (ref 7–16)
APPEARANCE UR: CLEAR
AST SERPL-CCNC: 21 U/L (ref 5–34)
BASOPHILS # BLD: 0 K/MM3 (ref 0–0.2)
BASOPHILS NFR BLD AUTO: 0 % (ref 0–2)
BILIRUB SERPL-MCNC: 0.5 MG/DL (ref 0.2–1.2)
BUN SERPL-MCNC: 11 MG/DL (ref 7–19)
CALCIUM SERPL-MCNC: 8.9 MG/DL (ref 8.4–10.2)
CHLORIDE SERPL-SCNC: 105 MEQ/L (ref 98–107)
CREAT SERPL-SCNC: 0.76 MG/DL (ref 0.57–1.11)
EOSINOPHIL # BLD: 0 K/MM3 (ref 0–0.7)
EOSINOPHIL NFR BLD: 0 % (ref 0–4)
ERYTHROCYTE [DISTWIDTH] IN BLOOD BY AUTOMATED COUNT: 12.4 % (ref 11.5–14.5)
GLUCOSE SERPL-MCNC: 91 MG/DL (ref 70–99)
GLUCOSE UR QL STRIP.AUTO: NEGATIVE
GRANULOCYTES # BLD AUTO: 93.4 % (ref 42.2–75.2)
HCT VFR BLD AUTO: 41.9 % (ref 37–47)
HGB BLD-MCNC: 13.9 G/DL (ref 12.5–16)
KETONES UR STRIP.AUTO-MCNC: (no result) MG/DL
LIPASE SERPL-CCNC: 25 U/L (ref 8–78)
LYMPHOCYTES # BLD: 0.2 K/MM3 (ref 1.2–3.4)
LYMPHOCYTES NFR BLD: 2.8 % (ref 20–51)
MCH RBC QN AUTO: 30 PG (ref 27–31)
MCHC RBC AUTO-ENTMCNC: 33 G/DL (ref 33–37)
MCV RBC AUTO: 91 FL (ref 80–100)
MONOCYTES # BLD: 0.3 K/MM3 (ref 0.1–0.6)
MONOCYTES NFR BLD AUTO: 3.5 % (ref 1.7–9.3)
MUCOUS THREADS #/AREA URNS LPF: PRESENT /[LPF]
NEUTROPHILS # BLD: 6.7 K/MM3 (ref 1.4–6.5)
NITRATE UR-MCNC: NEGATIVE UG/ML
PH UR STRIP.AUTO: 7 [PH] (ref 5–8.5)
PLATELET # BLD AUTO: 186 K/MM3 (ref 130–400)
PMV BLD AUTO: 10.5 FL (ref 7.4–10.4)
POTASSIUM SERPL-SCNC: 3.6 MEQ/L (ref 3.5–4.5)
PROT SERPL-MCNC: 7.7 G/DL (ref 6.2–8.1)
RBC # BLD AUTO: 4.6 M/MM3 (ref 4.1–5.3)
RBC # UR STRIP.AUTO: NEGATIVE /UL
RBC # UR: (no result) /HPF (ref 0–2)
SODIUM SERPL-SCNC: 137 MEQ/L (ref 136–145)
SP GR UR STRIP.AUTO: 1.03 (ref 1–1.03)
SQUAMOUS # URNS: (no result) /HPF (ref 0–10)
TROPONIN I SERPL-MCNC: < 0.01 NG/ML (ref 0–0.03)
UA DIPSTICK PNL UR STRIP.AUTO: (no result)
URN COLLECT METHOD CLASS: (no result)
UROBILINOGEN UR STRIP.AUTO-MCNC: 1 E.U/DL (ref 0.2–1)
WBC # UR: (no result) /HPF (ref 0–2)